# Patient Record
Sex: MALE | Race: WHITE | NOT HISPANIC OR LATINO | Employment: FULL TIME | URBAN - METROPOLITAN AREA
[De-identification: names, ages, dates, MRNs, and addresses within clinical notes are randomized per-mention and may not be internally consistent; named-entity substitution may affect disease eponyms.]

---

## 2018-03-01 ENCOUNTER — HOSPITAL ENCOUNTER (EMERGENCY)
Facility: HOSPITAL | Age: 33
Discharge: HOME | End: 2018-03-01
Attending: EMERGENCY MEDICINE
Payer: MEDICAID

## 2018-03-01 VITALS
HEIGHT: 71 IN | HEART RATE: 92 BPM | RESPIRATION RATE: 18 BRPM | TEMPERATURE: 98 F | OXYGEN SATURATION: 99 % | DIASTOLIC BLOOD PRESSURE: 76 MMHG | BODY MASS INDEX: 22.4 KG/M2 | SYSTOLIC BLOOD PRESSURE: 122 MMHG | WEIGHT: 160 LBS

## 2018-03-01 DIAGNOSIS — K08.89 PAIN, DENTAL: Primary | ICD-10-CM

## 2018-03-01 PROCEDURE — 99283 EMERGENCY DEPT VISIT LOW MDM: CPT

## 2018-03-01 RX ORDER — TRAMADOL HYDROCHLORIDE 50 MG/1
50 TABLET ORAL EVERY 6 HOURS PRN
Qty: 10 TABLET | Refills: 0 | Status: SHIPPED | OUTPATIENT
Start: 2018-03-01 | End: 2018-03-11

## 2018-03-01 RX ORDER — CLINDAMYCIN HYDROCHLORIDE 150 MG/1
300 CAPSULE ORAL 4 TIMES DAILY
Qty: 56 CAPSULE | Refills: 0 | Status: SHIPPED | OUTPATIENT
Start: 2018-03-01 | End: 2018-03-08

## 2018-03-01 NOTE — ED PROVIDER NOTES
Encounter Date: 3/1/2018    SCRIBE #1 NOTE: I, Karli Red, am scribing for, and in the presence of, Dr. Wood.       History     Chief Complaint   Patient presents with    Dental Pain     needing antibiotics, here with family member from [place]     Time seen by provider: 4:02 PM    This is a 33 y.o. male with no medical conditions on record, who presents with complaint of dental issues. The patient describes that over the past few days he has had pain and sensitivity to the right lower teeth. He denies fever. The patient reports several broken teeth as well. He states he is in the ED today with a family member who is being admitted. He is a smoker. He denies any other complaints or systemic symptoms at this time.         The history is provided by the patient.     Review of patient's allergies indicates:  Allergies not on file  History reviewed. No pertinent past medical history.  History reviewed. No pertinent surgical history.  History reviewed. No pertinent family history.  Social History   Substance Use Topics    Smoking status: Current Every Day Smoker     Packs/day: 1.00     Types: Cigarettes    Smokeless tobacco: Never Used    Alcohol use No     Review of Systems   Constitutional: Negative for chills and fever.   HENT: Positive for dental problem. Negative for ear pain, facial swelling, trouble swallowing and voice change.    Skin: Negative for wound.   Neurological: Negative for speech difficulty.   All other systems reviewed and are negative.      Physical Exam     Initial Vitals [03/01/18 1600]   BP Pulse Resp Temp SpO2   122/76 92 18 98.4 °F (36.9 °C) 99 %      MAP       91.33         Physical Exam    Nursing note and vitals reviewed.  Constitutional: He appears well-developed and well-nourished. He is not diaphoretic. No distress.   HENT:   Head: Normocephalic and atraumatic.   Mouth/Throat: Oropharynx is clear and moist.   Poor dentition. Multiple fractured teeth and dental carries. No  obvious abscess. Tenderness to palpation of right lower canine and molars.    Eyes: Conjunctivae are normal. No scleral icterus.   Neck: Normal range of motion. Neck supple. No JVD present.   Cardiovascular: Normal rate and regular rhythm.   Pulmonary/Chest: Breath sounds normal. No respiratory distress.   Musculoskeletal: Normal range of motion. He exhibits no edema.   Neurological: He is alert and oriented to person, place, and time.   Skin: Skin is warm and dry.   Psychiatric: He has a normal mood and affect. His behavior is normal.         ED Course   Procedures  Labs Reviewed - No data to display          Medical Decision Making:   History:   Old Medical Records: I decided to obtain old medical records.  Initial Assessment:   Pt p/w dental pain requesting Clindamycin and pain medication.  There is no obvious dental abscess although he does have very poor dentition and multiple fractured teeth/dental caries.  Will provide Rx for abx and 10 tablets Ultram (no recent Rx per  monitoring).  He will f/u w/ dentistry upon his return home to [place] next wk.            Scribe Attestation:   Scribe #1: I performed the above scribed service and the documentation accurately describes the services I performed. I attest to the accuracy of the note.    Attending Attestation:           Physician Attestation for Scribe:      Comments: I, Dr. Maribel Wood, personally performed the services described in this documentation. All medical record entries made by the scribe were at my direction and in my presence.  I have reviewed the chart and agree that the record reflects my personal performance and is accurate and complete. Maribel Wood MD.  4:42 PM 03/01/2018                 Clinical Impression:   The encounter diagnosis was Pain, dental.    Disposition:   Disposition: Discharged  Condition: Stable                        Maribel Wood MD  03/01/18 [id number]

## 2018-03-01 NOTE — ED NOTES
Patient identifiers verified and correct for MR Ghotra  C/C: Mult tooth pain.   APPEARANCE: awake and alert in NAD.  SKIN: warm, dry and intact. No breakdown or bruising.  MUSCULOSKELETAL: Patient moving all extremities spontaneously, no obvious swelling or deformities noted. Ambulates independently.  RESPIRATORY: Denies shortness of breath.Respirations unlabored.   CARDIAC: Denies CP, 2+ distal pulses; no peripheral edema  ABDOMEN: S/ND/NT, Denies nausea  : voids spontaneously, denies difficulty  Neurologic: AAO x 4; follows commands equal strength in all extremities; denies numbness/tingling. Denies dizziness States gen right lower tooth pain, pain to upper jaw with missing tooth.

## 2018-03-01 NOTE — ED TRIAGE NOTES
Patient states lower jaw/tooth pain with broken tooth right lower jaw. In ED with family member waiting for bed. Requests Tramadol by name.